# Patient Record
Sex: FEMALE | Race: WHITE | NOT HISPANIC OR LATINO
[De-identification: names, ages, dates, MRNs, and addresses within clinical notes are randomized per-mention and may not be internally consistent; named-entity substitution may affect disease eponyms.]

---

## 2023-12-14 PROBLEM — Z00.00 ENCOUNTER FOR PREVENTIVE HEALTH EXAMINATION: Status: ACTIVE | Noted: 2023-12-14

## 2024-01-08 ENCOUNTER — APPOINTMENT (OUTPATIENT)
Dept: GASTROENTEROLOGY | Facility: CLINIC | Age: 64
End: 2024-01-08
Payer: COMMERCIAL

## 2024-01-08 VITALS
RESPIRATION RATE: 12 BRPM | DIASTOLIC BLOOD PRESSURE: 78 MMHG | WEIGHT: 207 LBS | BODY MASS INDEX: 33.27 KG/M2 | HEIGHT: 66 IN | SYSTOLIC BLOOD PRESSURE: 130 MMHG | OXYGEN SATURATION: 98 % | TEMPERATURE: 98.1 F | HEART RATE: 103 BPM

## 2024-01-08 DIAGNOSIS — R10.11 RIGHT UPPER QUADRANT PAIN: ICD-10-CM

## 2024-01-08 DIAGNOSIS — M54.50 LOW BACK PAIN, UNSPECIFIED: ICD-10-CM

## 2024-01-08 DIAGNOSIS — E78.2 MIXED HYPERLIPIDEMIA: ICD-10-CM

## 2024-01-08 DIAGNOSIS — M54.9 DORSALGIA, UNSPECIFIED: ICD-10-CM

## 2024-01-08 PROCEDURE — 99203 OFFICE O/P NEW LOW 30 MIN: CPT | Mod: 25

## 2024-01-08 PROCEDURE — 83014 H PYLORI DRUG ADMIN: CPT

## 2024-01-08 RX ORDER — CYCLOBENZAPRINE HYDROCHLORIDE 7.5 MG/1
TABLET, FILM COATED ORAL
Refills: 0 | Status: ACTIVE | COMMUNITY

## 2024-01-08 RX ORDER — ROSUVASTATIN CALCIUM 5 MG/1
TABLET, FILM COATED ORAL
Refills: 0 | Status: ACTIVE | COMMUNITY

## 2024-01-09 LAB — UREA BREATH TEST QL: NEGATIVE

## 2024-01-10 ENCOUNTER — NON-APPOINTMENT (OUTPATIENT)
Age: 64
End: 2024-01-10

## 2024-03-18 NOTE — PHYSICAL EXAM
[Alert] : alert [Normal Voice/Communication] : normal voice/communication [Healthy Appearing] : healthy appearing [No Acute Distress] : no acute distress [Sclera] : the sclera and conjunctiva were normal [Hearing Threshold Finger Rub Not [place]] : hearing was normal [Normal Lips/Gums] : the lips and gums were normal [Oropharynx] : the oropharynx was normal [Normal Appearance] : the appearance of the neck was normal [No Neck Mass] : no neck mass was observed [No Acc Muscle Use] : no accessory muscle use [No Respiratory Distress] : no respiratory distress [Respiration, Rhythm And Depth] : normal respiratory rhythm and effort [Auscultation Breath Sounds / Voice Sounds] : lungs were clear to auscultation bilaterally [Heart Rate And Rhythm] : heart rate was normal and rhythm regular [Normal S1, S2] : normal S1 and S2 [Murmurs] : no murmurs [Bowel Sounds] : normal bowel sounds [No Masses] : no abdominal mass palpated [Abdomen Tenderness] : non-tender [] : no hepatosplenomegaly [Oriented To Time, Place, And Person] : oriented to person, place, and time [Abdomen Soft] : soft

## 2024-03-18 NOTE — PHYSICAL EXAM
[Alert] : alert [Normal Voice/Communication] : normal voice/communication [Healthy Appearing] : healthy appearing [No Acute Distress] : no acute distress [Sclera] : the sclera and conjunctiva were normal [Hearing Threshold Finger Rub Not [place]] : hearing was normal [Normal Lips/Gums] : the lips and gums were normal [Oropharynx] : the oropharynx was normal [Normal Appearance] : the appearance of the neck was normal [No Neck Mass] : no neck mass was observed [No Respiratory Distress] : no respiratory distress [No Acc Muscle Use] : no accessory muscle use [Respiration, Rhythm And Depth] : normal respiratory rhythm and effort [Auscultation Breath Sounds / Voice Sounds] : lungs were clear to auscultation bilaterally [Heart Rate And Rhythm] : heart rate was normal and rhythm regular [Normal S1, S2] : normal S1 and S2 [Murmurs] : no murmurs [Bowel Sounds] : normal bowel sounds [No Masses] : no abdominal mass palpated [Abdomen Tenderness] : non-tender [Oriented To Time, Place, And Person] : oriented to person, place, and time [] : no hepatosplenomegaly [Abdomen Soft] : soft

## 2024-03-20 NOTE — ADDENDUM
[FreeTextEntry1] : sonogram at [facility] [place] demonstrate several gallstones. referred to Edwar Martin MD at [facility] for consultation for elective ccy  Compa Cutler MD, FACP, AGAF, FAASLD  of Medicine [facility]

## 2024-03-20 NOTE — HISTORY OF PRESENT ILLNESS
[FreeTextEntry1] : 64 yo female, with several months of RUQ pain. No n/v. States UTD with  colonoscopy. There is a maternal hx of colon cancer. No weight loss, anorexia, dark urine or n/v. No recent foreign travel.

## 2024-03-20 NOTE — REASON FOR VISIT
[Initial Evaluation] : an initial evaluation [Follow-up] : a follow-up of an existing diagnosis [FreeTextEntry1] : ruq pain

## 2024-03-20 NOTE — HISTORY OF PRESENT ILLNESS
[FreeTextEntry1] : 62 yo female, with several months of RUQ pain. No n/v. States UTD with  colonoscopy. There is a maternal hx of colon cancer. No weight loss, anorexia, dark urine or n/v. No recent foreign travel.

## 2024-03-20 NOTE — ADDENDUM
[FreeTextEntry1] : sonogram at [facility] [place] demonstrate several gallstones. referred to Edwar Martin MD at [facility] for consultation for elective ccy  Compa Cutler MD, FACP, AGAF, FAASLD  of Medicine [facility]

## 2024-03-20 NOTE — ASSESSMENT
[FreeTextEntry1] : 62 yo female, with several months of RUQ pain. No n/v. States UTD with  colonoscopy. There is a maternal hx of colon cancer. No weight loss, anorexia, dark urine or n/v. No recent foreign travel.  IMP: 1. RUQ pain, possible gallstones 2. Doubt Hpylori 3. Hx HLD 4. Back pain  PLAN: 1. sonogram of abdomen 2. UBT

## 2024-03-20 NOTE — ASSESSMENT
[FreeTextEntry1] : 64 yo female, with several months of RUQ pain. No n/v. States UTD with  colonoscopy. There is a maternal hx of colon cancer. No weight loss, anorexia, dark urine or n/v. No recent foreign travel.  IMP: 1. RUQ pain, possible gallstones 2. Doubt Hpylori 3. Hx HLD 4. Back pain  PLAN: 1. sonogram of abdomen 2. UBT

## 2024-08-15 ENCOUNTER — APPOINTMENT (OUTPATIENT)
Dept: GASTROENTEROLOGY | Facility: CLINIC | Age: 64
End: 2024-08-15